# Patient Record
Sex: FEMALE | URBAN - METROPOLITAN AREA
[De-identification: names, ages, dates, MRNs, and addresses within clinical notes are randomized per-mention and may not be internally consistent; named-entity substitution may affect disease eponyms.]

---

## 2023-08-15 ENCOUNTER — APPOINTMENT (RX ONLY)
Dept: URBAN - METROPOLITAN AREA CLINIC 88 | Facility: CLINIC | Age: 48
Setting detail: DERMATOLOGY
End: 2023-08-15

## 2023-08-15 DIAGNOSIS — Z01.818 ENCOUNTER FOR OTHER PREPROCEDURAL EXAMINATION: ICD-10-CM

## 2023-08-15 PROCEDURE — ? ADDITIONAL NOTES

## 2023-08-15 NOTE — PROCEDURE: ADDITIONAL NOTES
Render Risk Assessment In Note?: no
Additional Notes: SX Date: 10/04/23 SX - Explant, lift , tummy tuck + add ons. \"From [facility]\"\\nPre-op call 08/15/23\\n\\nHT: 5'2 / WT: 130 lbs / BMI:\\nAllergies: Morphine (rash, congestion), aspirin (anaphylaxis). \\nPMH: Fibromyalgia, carpal tunnel, recurrent renal stone, migraines, ovary hemorrhagic cyst, pre-diabetes (diet controlled), asthma. \\nHospitalizations: kidney stones multiple times. Hysterectomy (13 y/a). Bariatric surgery (2022). \\nMeds: Vitamins D, B12, kidney supplements, percocet (as needed myalgia), neurontin. \\nSX HX:\\nHx of Nausea after sx: No\\nSmoke/Vape/Hookah: Denies\\nETOH: Denies. \\n\\nImplants: Yes. \\nPath: Surgeon's discretion. \\n\\nExparel: Yes. \\nMyers: Yes. \\nBB: Yes. \\n\\nCousin [place]\" will be taking care of her. \\nMeds sent to pharmacy, Follow up visits scheduled. \\nLabwork CBC w Diff, CMP / EKG / Photos requested. \\nRecommendations prior to surgery were given. \\nStop blood thinners 2 weeks prior. \\nNicotine test day of sx, preg test day of sx. , Pt verbalized understanding and agreement.

## 2023-08-16 ENCOUNTER — RX ONLY (OUTPATIENT)
Age: 48
Setting detail: RX ONLY
End: 2023-08-16

## 2023-08-16 RX ORDER — CEPHALEXIN 500 MG/1
CAPSULE ORAL
Qty: 21 | Refills: 2 | Status: ERX | COMMUNITY
Start: 2023-08-16

## 2023-08-16 RX ORDER — PROMETHAZINE HYDROCHLORIDE 12.5 MG/1
TABLET ORAL
Qty: 20 | Refills: 0 | Status: ERX | COMMUNITY
Start: 2023-08-16

## 2023-08-16 RX ORDER — CYCLOBENZAPRINE HYDROCHLORIDE 5 MG/1
TABLET, FILM COATED ORAL
Qty: 30 | Refills: 0 | Status: ERX | COMMUNITY
Start: 2023-08-16

## 2023-08-16 RX ORDER — APREPITANT 40 MG/1
CAPSULE ORAL
Qty: 1 | Refills: 0 | Status: ERX | COMMUNITY
Start: 2023-08-16

## 2023-09-28 ENCOUNTER — RX ONLY (OUTPATIENT)
Age: 48
Setting detail: RX ONLY
End: 2023-09-28

## 2023-09-28 RX ORDER — OXYCODONE HYDROCHLORIDE AND ACETAMINOPHEN 5; 325 MG/1; MG/1
TABLET ORAL
Qty: 28 | Refills: 0 | Status: ERX | COMMUNITY
Start: 2023-09-28

## 2023-10-11 ENCOUNTER — APPOINTMENT (RX ONLY)
Dept: URBAN - METROPOLITAN AREA CLINIC 88 | Facility: CLINIC | Age: 48
Setting detail: DERMATOLOGY
End: 2023-10-11

## 2023-11-02 ENCOUNTER — APPOINTMENT (RX ONLY)
Dept: URBAN - METROPOLITAN AREA CLINIC 88 | Facility: CLINIC | Age: 48
Setting detail: DERMATOLOGY
End: 2023-11-02